# Patient Record
Sex: MALE | Race: OTHER | HISPANIC OR LATINO | URBAN - METROPOLITAN AREA
[De-identification: names, ages, dates, MRNs, and addresses within clinical notes are randomized per-mention and may not be internally consistent; named-entity substitution may affect disease eponyms.]

---

## 2022-03-29 ENCOUNTER — EMERGENCY (EMERGENCY)
Facility: HOSPITAL | Age: 22
LOS: 1 days | Discharge: ANOTHER HOSPITAL | End: 2022-03-29
Attending: EMERGENCY MEDICINE
Payer: MEDICAID

## 2022-03-29 ENCOUNTER — EMERGENCY (EMERGENCY)
Facility: HOSPITAL | Age: 22
LOS: 1 days | Discharge: HOME | End: 2022-03-29
Attending: EMERGENCY MEDICINE | Admitting: EMERGENCY MEDICINE
Payer: MEDICAID

## 2022-03-29 VITALS
SYSTOLIC BLOOD PRESSURE: 122 MMHG | HEART RATE: 90 BPM | OXYGEN SATURATION: 99 % | DIASTOLIC BLOOD PRESSURE: 75 MMHG | TEMPERATURE: 100 F | RESPIRATION RATE: 18 BRPM

## 2022-03-29 VITALS
RESPIRATION RATE: 16 BRPM | SYSTOLIC BLOOD PRESSURE: 102 MMHG | OXYGEN SATURATION: 100 % | DIASTOLIC BLOOD PRESSURE: 67 MMHG | TEMPERATURE: 98 F | HEART RATE: 95 BPM

## 2022-03-29 VITALS — WEIGHT: 143.96 LBS | HEIGHT: 64 IN

## 2022-03-29 DIAGNOSIS — R22.0 LOCALIZED SWELLING, MASS AND LUMP, HEAD: ICD-10-CM

## 2022-03-29 DIAGNOSIS — L03.211 CELLULITIS OF FACE: ICD-10-CM

## 2022-03-29 DIAGNOSIS — R13.10 DYSPHAGIA, UNSPECIFIED: ICD-10-CM

## 2022-03-29 DIAGNOSIS — Z20.822 CONTACT WITH AND (SUSPECTED) EXPOSURE TO COVID-19: ICD-10-CM

## 2022-03-29 DIAGNOSIS — R25.2 CRAMP AND SPASM: ICD-10-CM

## 2022-03-29 DIAGNOSIS — R68.84 JAW PAIN: ICD-10-CM

## 2022-03-29 LAB
ALBUMIN SERPL ELPH-MCNC: 3.6 G/DL — SIGNIFICANT CHANGE UP (ref 3.3–5)
ALP SERPL-CCNC: 78 U/L — SIGNIFICANT CHANGE UP (ref 40–120)
ALT FLD-CCNC: 37 U/L — SIGNIFICANT CHANGE UP (ref 12–78)
ANION GAP SERPL CALC-SCNC: 7 MMOL/L — SIGNIFICANT CHANGE UP (ref 5–17)
AST SERPL-CCNC: 19 U/L — SIGNIFICANT CHANGE UP (ref 15–37)
BASOPHILS # BLD AUTO: 0.04 K/UL — SIGNIFICANT CHANGE UP (ref 0–0.2)
BASOPHILS NFR BLD AUTO: 0.3 % — SIGNIFICANT CHANGE UP (ref 0–2)
BILIRUB SERPL-MCNC: 0.4 MG/DL — SIGNIFICANT CHANGE UP (ref 0.2–1.2)
BUN SERPL-MCNC: 7 MG/DL — SIGNIFICANT CHANGE UP (ref 7–23)
CALCIUM SERPL-MCNC: 9.3 MG/DL — SIGNIFICANT CHANGE UP (ref 8.5–10.1)
CHLORIDE SERPL-SCNC: 103 MMOL/L — SIGNIFICANT CHANGE UP (ref 96–108)
CO2 SERPL-SCNC: 26 MMOL/L — SIGNIFICANT CHANGE UP (ref 22–31)
CREAT SERPL-MCNC: 0.72 MG/DL — SIGNIFICANT CHANGE UP (ref 0.5–1.3)
EGFR: 132 ML/MIN/1.73M2 — SIGNIFICANT CHANGE UP
EOSINOPHIL # BLD AUTO: 0.05 K/UL — SIGNIFICANT CHANGE UP (ref 0–0.5)
EOSINOPHIL NFR BLD AUTO: 0.4 % — SIGNIFICANT CHANGE UP (ref 0–6)
GLUCOSE SERPL-MCNC: 93 MG/DL — SIGNIFICANT CHANGE UP (ref 70–99)
HCT VFR BLD CALC: 49.2 % — SIGNIFICANT CHANGE UP (ref 39–50)
HGB BLD-MCNC: 16.5 G/DL — SIGNIFICANT CHANGE UP (ref 13–17)
IMM GRANULOCYTES NFR BLD AUTO: 0.3 % — SIGNIFICANT CHANGE UP (ref 0–1.5)
LYMPHOCYTES # BLD AUTO: 1.15 K/UL — SIGNIFICANT CHANGE UP (ref 1–3.3)
LYMPHOCYTES # BLD AUTO: 8.1 % — LOW (ref 13–44)
MCHC RBC-ENTMCNC: 28.6 PG — SIGNIFICANT CHANGE UP (ref 27–34)
MCHC RBC-ENTMCNC: 33.5 GM/DL — SIGNIFICANT CHANGE UP (ref 32–36)
MCV RBC AUTO: 85.3 FL — SIGNIFICANT CHANGE UP (ref 80–100)
MONOCYTES # BLD AUTO: 1.66 K/UL — HIGH (ref 0–0.9)
MONOCYTES NFR BLD AUTO: 11.6 % — SIGNIFICANT CHANGE UP (ref 2–14)
NEUTROPHILS # BLD AUTO: 11.32 K/UL — HIGH (ref 1.8–7.4)
NEUTROPHILS NFR BLD AUTO: 79.3 % — HIGH (ref 43–77)
PLATELET # BLD AUTO: 193 K/UL — SIGNIFICANT CHANGE UP (ref 150–400)
POTASSIUM SERPL-MCNC: 3.5 MMOL/L — SIGNIFICANT CHANGE UP (ref 3.5–5.3)
POTASSIUM SERPL-SCNC: 3.5 MMOL/L — SIGNIFICANT CHANGE UP (ref 3.5–5.3)
PROT SERPL-MCNC: 8.1 GM/DL — SIGNIFICANT CHANGE UP (ref 6–8.3)
RBC # BLD: 5.77 M/UL — SIGNIFICANT CHANGE UP (ref 4.2–5.8)
RBC # FLD: 13.2 % — SIGNIFICANT CHANGE UP (ref 10.3–14.5)
SODIUM SERPL-SCNC: 136 MMOL/L — SIGNIFICANT CHANGE UP (ref 135–145)
WBC # BLD: 14.26 K/UL — HIGH (ref 3.8–10.5)
WBC # FLD AUTO: 14.26 K/UL — HIGH (ref 3.8–10.5)

## 2022-03-29 PROCEDURE — 99285 EMERGENCY DEPT VISIT HI MDM: CPT

## 2022-03-29 PROCEDURE — 70487 CT MAXILLOFACIAL W/DYE: CPT | Mod: MA

## 2022-03-29 PROCEDURE — 36415 COLL VENOUS BLD VENIPUNCTURE: CPT

## 2022-03-29 PROCEDURE — 70487 CT MAXILLOFACIAL W/DYE: CPT | Mod: 26,MA

## 2022-03-29 PROCEDURE — 87040 BLOOD CULTURE FOR BACTERIA: CPT

## 2022-03-29 PROCEDURE — 80053 COMPREHEN METABOLIC PANEL: CPT

## 2022-03-29 PROCEDURE — 96374 THER/PROPH/DIAG INJ IV PUSH: CPT

## 2022-03-29 PROCEDURE — 99284 EMERGENCY DEPT VISIT MOD MDM: CPT

## 2022-03-29 PROCEDURE — 96375 TX/PRO/DX INJ NEW DRUG ADDON: CPT

## 2022-03-29 PROCEDURE — 99284 EMERGENCY DEPT VISIT MOD MDM: CPT | Mod: 25

## 2022-03-29 PROCEDURE — U0003: CPT

## 2022-03-29 PROCEDURE — U0005: CPT

## 2022-03-29 PROCEDURE — 85025 COMPLETE CBC W/AUTO DIFF WBC: CPT

## 2022-03-29 RX ORDER — SODIUM CHLORIDE 9 MG/ML
1000 INJECTION INTRAMUSCULAR; INTRAVENOUS; SUBCUTANEOUS ONCE
Refills: 0 | Status: COMPLETED | OUTPATIENT
Start: 2022-03-29 | End: 2022-03-29

## 2022-03-29 RX ORDER — KETOROLAC TROMETHAMINE 30 MG/ML
30 SYRINGE (ML) INJECTION ONCE
Refills: 0 | Status: DISCONTINUED | OUTPATIENT
Start: 2022-03-29 | End: 2022-03-29

## 2022-03-29 RX ADMIN — Medication 30 MILLIGRAM(S): at 19:27

## 2022-03-29 RX ADMIN — Medication 100 MILLIGRAM(S): at 19:29

## 2022-03-29 RX ADMIN — SODIUM CHLORIDE 1000 MILLILITER(S): 9 INJECTION INTRAMUSCULAR; INTRAVENOUS; SUBCUTANEOUS at 21:21

## 2022-03-29 NOTE — ED STATDOCS - ENMT, MLM
Nasal mucosa clear.  Mouth with normal mucosa  Throat has no vesicles, no oropharyngeal exudates and uvula is midline. +Left mandibular tenderness and overlying swelling. No overlying cellulites. +2 finger trismus. +Poor dentition.

## 2022-03-29 NOTE — ED STATDOCS - CLINICAL SUMMARY MEDICAL DECISION MAKING FREE TEXT BOX
signed Alexandra Busby PA-C   Pt with facial cellulitis and swelling, trismus, difficulty swallowing but tolerating secretions. Early abscess on CT. Transfer to [facility] ER for admission and ENT consult. Pt agrees with plan of  care. signed Alexandra Busby PA-C   Pt with facial cellulitis and swelling, trismus, difficulty swallowing but tolerating secretions. Early abscess on CT. Transfer to [facility] ER for admission and ENT consult. Pt agrees with plan of  care.    Agree with above. Ashwin Lee D.O.

## 2022-03-29 NOTE — ED ADULT TRIAGE NOTE - CHIEF COMPLAINT QUOTE
c/o left sided neck swelling and pain in the left jaw. transferred from [facility] for ENT eval. pt reports poor oral intake secondary to pain, no drooling noted, no airway compromise observed, pt received clindamycin 600 mg IVPB and 2 L of NS PTA.

## 2022-03-29 NOTE — ED STATDOCS - CARE PLAN
Patient informed that per Dr. Negrete he should take an additional dose of Cozaar tonight. Informed patient to increase to one tablet twice daily tomorrow. Scheduled patient with NP tomorrow at 8:45 am. Advised patient that he needs to go to ER if diastolic (bottom number of BP reading) goes higher than 105 mm HG, or he develops chest pain, SOB, HA, dizziness/lightheadedness. Patient verbalized understanding.    1 Principal Discharge DX:	Facial cellulitis  Secondary Diagnosis:	Trismus  Secondary Diagnosis:	Difficulty swallowing

## 2022-03-29 NOTE — ED ADULT TRIAGE NOTE - CHIEF COMPLAINT QUOTE
Pt presents to ED s/p left jaw pain/swelling that began 4 days ago. tender on palpation. Endorses decreased PO intake. Pt endorses difficulty swallowing.

## 2022-03-29 NOTE — ED STATDOCS - NS ED ATTENDING STATEMENT MOD
This was a shared visit with the CATHY. I reviewed and verified the documentation and independently performed the documented:

## 2022-03-29 NOTE — ED STATDOCS - PROGRESS NOTE DETAILS
signed Alexandra Busby PA-C Pt seen initially in intake by Dr Lee.  ID [id number]  22M c/o pain in his left lower molar x 4 days and now having pain and swelling, trouble opening his mouth and difficulty swallowing. pt denies any difficulty breathing. Pt alert, NAD, left lower facial swelling, +trismus. Transfer center called for ENT consult and transfer. WBC 14, facial cellulitis with some fluid collection on CT. signed Alexandra Busby PA-C  ID   I spoke to ENT Dr Reid and [facility] ER Dr Curtis, pt to be transferred to [facility] ER for ENT eval, admission, and IV abx. pt stable at this time for transfer, airway patent and pt NAD. signed Alexandra Busby PA-C  ID [id number]  I spoke to ENT Dr Reid and [facility] ER Dr Curtis, pt to be transferred to [facility] ER for ENT eval, admission, and IV abx. pt stable at this time for transfer, airway patent and pt NAD. Pt agrees with transfer and plan of care.

## 2022-03-29 NOTE — ED STATDOCS - OBJECTIVE STATEMENT
23 y/o male with no pertinent PMHx presents to the ED c/o left  jaw pain and swelling x 3 days. +Difficulty opening mouth secondary to the pain. Denies fever, overlying redness, skin changes. Denies prior occurrence. Denies trauma. NKDA.

## 2022-03-29 NOTE — ED ADULT NURSE NOTE - OBJECTIVE STATEMENT
Pt complaining of Jaw pain x 4 days. Sts he is unable to eat due to the pain.  AAOx3. Respirations even and unlabored, NAD. Skin warm, dry, color appropriate.

## 2022-03-30 DIAGNOSIS — K11.20 SIALOADENITIS, UNSPECIFIED: ICD-10-CM

## 2022-03-30 LAB
ALBUMIN SERPL ELPH-MCNC: 3.7 G/DL — SIGNIFICANT CHANGE UP (ref 3.3–5)
ALP SERPL-CCNC: 73 U/L — SIGNIFICANT CHANGE UP (ref 40–120)
ALT FLD-CCNC: 24 U/L — SIGNIFICANT CHANGE UP (ref 4–41)
ANION GAP SERPL CALC-SCNC: 14 MMOL/L — SIGNIFICANT CHANGE UP (ref 7–14)
AST SERPL-CCNC: 26 U/L — SIGNIFICANT CHANGE UP (ref 4–40)
BASOPHILS # BLD AUTO: 0.04 K/UL — SIGNIFICANT CHANGE UP (ref 0–0.2)
BASOPHILS NFR BLD AUTO: 0.3 % — SIGNIFICANT CHANGE UP (ref 0–2)
BILIRUB SERPL-MCNC: 0.3 MG/DL — SIGNIFICANT CHANGE UP (ref 0.2–1.2)
BUN SERPL-MCNC: 8 MG/DL — SIGNIFICANT CHANGE UP (ref 7–23)
CALCIUM SERPL-MCNC: 8.7 MG/DL — SIGNIFICANT CHANGE UP (ref 8.4–10.5)
CHLORIDE SERPL-SCNC: 104 MMOL/L — SIGNIFICANT CHANGE UP (ref 98–107)
CO2 SERPL-SCNC: 21 MMOL/L — LOW (ref 22–31)
CREAT SERPL-MCNC: 0.65 MG/DL — SIGNIFICANT CHANGE UP (ref 0.5–1.3)
EGFR: 136 ML/MIN/1.73M2 — SIGNIFICANT CHANGE UP
EOSINOPHIL # BLD AUTO: 0.04 K/UL — SIGNIFICANT CHANGE UP (ref 0–0.5)
EOSINOPHIL NFR BLD AUTO: 0.3 % — SIGNIFICANT CHANGE UP (ref 0–6)
GLUCOSE SERPL-MCNC: 95 MG/DL — SIGNIFICANT CHANGE UP (ref 70–99)
HCT VFR BLD CALC: 45.5 % — SIGNIFICANT CHANGE UP (ref 39–50)
HGB BLD-MCNC: 15.4 G/DL — SIGNIFICANT CHANGE UP (ref 13–17)
IANC: 11.32 K/UL — HIGH (ref 1.8–7.4)
IMM GRANULOCYTES NFR BLD AUTO: 0.4 % — SIGNIFICANT CHANGE UP (ref 0–1.5)
LYMPHOCYTES # BLD AUTO: 1.16 K/UL — SIGNIFICANT CHANGE UP (ref 1–3.3)
LYMPHOCYTES # BLD AUTO: 7.9 % — LOW (ref 13–44)
MCHC RBC-ENTMCNC: 28.8 PG — SIGNIFICANT CHANGE UP (ref 27–34)
MCHC RBC-ENTMCNC: 33.8 GM/DL — SIGNIFICANT CHANGE UP (ref 32–36)
MCV RBC AUTO: 85 FL — SIGNIFICANT CHANGE UP (ref 80–100)
MONOCYTES # BLD AUTO: 2.03 K/UL — HIGH (ref 0–0.9)
MONOCYTES NFR BLD AUTO: 13.9 % — SIGNIFICANT CHANGE UP (ref 2–14)
NEUTROPHILS # BLD AUTO: 11.32 K/UL — HIGH (ref 1.8–7.4)
NEUTROPHILS NFR BLD AUTO: 77.2 % — HIGH (ref 43–77)
NRBC # BLD: 0 /100 WBCS — SIGNIFICANT CHANGE UP
NRBC # FLD: 0 K/UL — SIGNIFICANT CHANGE UP
PLATELET # BLD AUTO: 193 K/UL — SIGNIFICANT CHANGE UP (ref 150–400)
POTASSIUM SERPL-MCNC: 3.4 MMOL/L — LOW (ref 3.5–5.3)
POTASSIUM SERPL-SCNC: 3.4 MMOL/L — LOW (ref 3.5–5.3)
PROT SERPL-MCNC: 6.8 G/DL — SIGNIFICANT CHANGE UP (ref 6–8.3)
RBC # BLD: 5.35 M/UL — SIGNIFICANT CHANGE UP (ref 4.2–5.8)
RBC # FLD: 13.2 % — SIGNIFICANT CHANGE UP (ref 10.3–14.5)
SARS-COV-2 RNA SPEC QL NAA+PROBE: SIGNIFICANT CHANGE UP
SODIUM SERPL-SCNC: 139 MMOL/L — SIGNIFICANT CHANGE UP (ref 135–145)
WBC # BLD: 14.65 K/UL — HIGH (ref 3.8–10.5)
WBC # FLD AUTO: 14.65 K/UL — HIGH (ref 3.8–10.5)

## 2022-03-30 PROCEDURE — 99235 HOSP IP/OBS SAME DATE MOD 70: CPT

## 2022-03-30 PROCEDURE — 99218: CPT

## 2022-03-30 RX ORDER — SODIUM CHLORIDE 9 MG/ML
1000 INJECTION INTRAMUSCULAR; INTRAVENOUS; SUBCUTANEOUS ONCE
Refills: 0 | Status: COMPLETED | OUTPATIENT
Start: 2022-03-30 | End: 2022-03-30

## 2022-03-30 RX ORDER — AMPICILLIN SODIUM AND SULBACTAM SODIUM 250; 125 MG/ML; MG/ML
3 INJECTION, POWDER, FOR SUSPENSION INTRAMUSCULAR; INTRAVENOUS EVERY 6 HOURS
Refills: 0 | Status: DISCONTINUED | OUTPATIENT
Start: 2022-03-30 | End: 2022-04-02

## 2022-03-30 RX ORDER — DEXAMETHASONE 0.5 MG/5ML
10 ELIXIR ORAL ONCE
Refills: 0 | Status: COMPLETED | OUTPATIENT
Start: 2022-03-30 | End: 2022-03-30

## 2022-03-30 RX ORDER — OXYCODONE AND ACETAMINOPHEN 5; 325 MG/1; MG/1
1 TABLET ORAL ONCE
Refills: 0 | Status: DISCONTINUED | OUTPATIENT
Start: 2022-03-30 | End: 2022-03-30

## 2022-03-30 RX ORDER — SODIUM CHLORIDE 9 MG/ML
1000 INJECTION INTRAMUSCULAR; INTRAVENOUS; SUBCUTANEOUS
Refills: 0 | Status: DISCONTINUED | OUTPATIENT
Start: 2022-03-30 | End: 2022-04-02

## 2022-03-30 RX ORDER — AMPICILLIN SODIUM AND SULBACTAM SODIUM 250; 125 MG/ML; MG/ML
3 INJECTION, POWDER, FOR SUSPENSION INTRAMUSCULAR; INTRAVENOUS ONCE
Refills: 0 | Status: COMPLETED | OUTPATIENT
Start: 2022-03-30 | End: 2022-03-30

## 2022-03-30 RX ORDER — KETOROLAC TROMETHAMINE 30 MG/ML
30 SYRINGE (ML) INJECTION EVERY 6 HOURS
Refills: 0 | Status: DISCONTINUED | OUTPATIENT
Start: 2022-03-30 | End: 2022-03-31

## 2022-03-30 RX ORDER — AMPICILLIN SODIUM AND SULBACTAM SODIUM 250; 125 MG/ML; MG/ML
INJECTION, POWDER, FOR SUSPENSION INTRAMUSCULAR; INTRAVENOUS
Refills: 0 | Status: DISCONTINUED | OUTPATIENT
Start: 2022-03-30 | End: 2022-04-02

## 2022-03-30 RX ADMIN — AMPICILLIN SODIUM AND SULBACTAM SODIUM 200 GRAM(S): 250; 125 INJECTION, POWDER, FOR SUSPENSION INTRAMUSCULAR; INTRAVENOUS at 07:45

## 2022-03-30 RX ADMIN — AMPICILLIN SODIUM AND SULBACTAM SODIUM 200 GRAM(S): 250; 125 INJECTION, POWDER, FOR SUSPENSION INTRAMUSCULAR; INTRAVENOUS at 02:35

## 2022-03-30 RX ADMIN — Medication 30 MILLIGRAM(S): at 12:55

## 2022-03-30 RX ADMIN — AMPICILLIN SODIUM AND SULBACTAM SODIUM 200 GRAM(S): 250; 125 INJECTION, POWDER, FOR SUSPENSION INTRAMUSCULAR; INTRAVENOUS at 12:38

## 2022-03-30 RX ADMIN — SODIUM CHLORIDE 125 MILLILITER(S): 9 INJECTION INTRAMUSCULAR; INTRAVENOUS; SUBCUTANEOUS at 18:38

## 2022-03-30 RX ADMIN — OXYCODONE AND ACETAMINOPHEN 1 TABLET(S): 5; 325 TABLET ORAL at 04:14

## 2022-03-30 RX ADMIN — Medication 30 MILLIGRAM(S): at 12:39

## 2022-03-30 RX ADMIN — Medication 30 MILLIGRAM(S): at 18:39

## 2022-03-30 RX ADMIN — Medication 102 MILLIGRAM(S): at 23:25

## 2022-03-30 RX ADMIN — OXYCODONE AND ACETAMINOPHEN 1 TABLET(S): 5; 325 TABLET ORAL at 04:56

## 2022-03-30 RX ADMIN — Medication 30 MILLIGRAM(S): at 05:02

## 2022-03-30 RX ADMIN — Medication 30 MILLIGRAM(S): at 05:36

## 2022-03-30 RX ADMIN — SODIUM CHLORIDE 1000 MILLILITER(S): 9 INJECTION INTRAMUSCULAR; INTRAVENOUS; SUBCUTANEOUS at 14:47

## 2022-03-30 RX ADMIN — Medication 102 MILLIGRAM(S): at 14:47

## 2022-03-30 RX ADMIN — Medication 30 MILLIGRAM(S): at 00:01

## 2022-03-30 RX ADMIN — AMPICILLIN SODIUM AND SULBACTAM SODIUM 3 GRAM(S): 250; 125 INJECTION, POWDER, FOR SUSPENSION INTRAMUSCULAR; INTRAVENOUS at 02:30

## 2022-03-30 RX ADMIN — AMPICILLIN SODIUM AND SULBACTAM SODIUM 200 GRAM(S): 250; 125 INJECTION, POWDER, FOR SUSPENSION INTRAMUSCULAR; INTRAVENOUS at 18:38

## 2022-03-30 RX ADMIN — Medication 30 MILLIGRAM(S): at 18:50

## 2022-03-30 NOTE — ED PROVIDER NOTE - PHYSICAL EXAMINATION
Vital signs reviewed.   CONSTITUTIONAL: Well-appearing; well-nourished; in no apparent distress. Non-toxic appearing.   HEAD: Normocephalic, atraumatic.  EYES:  Normal conjunctiva and no sclera injection noted  ENT: normal nose; no rhinorrhea; Unable to visualize soft palate/tonsils. (+) Trismus. Tolerating secretions. No drooling. Maintaining airway. LT external jaw swelling  NECK/LYMPH: Supple; non-tender; no cervical lymphadenopathy.  CARD: Normal S1, S2  RESP: Normal chest excursion with respiration; breath sounds clear and equal bilaterally  EXT/MS: moves all extremities; distal pulses are normal, no pedal edema.  SKIN: Normal for age and race; warm; dry; good turgor; no apparent lesions or exudate noted.  NEURO: Awake, alert, oriented x 3  PSYCH: Normal mood; appropriate affect.

## 2022-03-30 NOTE — ED CDU PROVIDER INITIAL DAY NOTE - OBJECTIVE STATEMENT
23 Y/O M w/ no PMH transferred from [facility] to ER for LT sided jaw pain. Admits to onset of symptoms 4 days ago. Reports worsening exterior swelling and pain to jaw. Experiencing pain with eating but tolerating PO oral intake. Seen at [place] ER earlier today. CT demonstrated enlarged submandibular gland, may represent possible phlegmon or early abscess. Denies fever, nausea/vomiting, dizziness, difficulty breathing, headache, chest pain, shortness of breath, palpitations. ENT evaluated patient. No airway involvement, likely sialoadenitis. IV abx, pain management, re-evaluate in am

## 2022-03-30 NOTE — PROGRESS NOTE ADULT - SUBJECTIVE AND OBJECTIVE BOX
HPI:  Patient is a 22y Male no PMH p/w 4 days L neck pain associated with trismus and odynophagia. Decreased PO intake. Subjective fevers. No h/o of similar sx. Seen at [place], CT scan there showed enlarged submandibular gland with surrounding cellulitis, non-rim enhancing fluid collection of L FOM representing possible phlegmon vs. early abscess. No SOB.      Physical Exam  T(C): 36.8 (03-29-22 @ 22:58), Max: 36.8 (03-29-22 @ 22:58)  HR: 95 (03-29-22 @ 22:58) (95 - 95)  BP: 102/67 (03-29-22 @ 22:58) (102/67 - 102/67)  RR: 16 (03-29-22 @ 22:58) (16 - 16)  SpO2: 100% (03-29-22 @ 22:58) (100% - 100%)  General: NAD, A+Ox3  Resp: No respiratory distress, stridor, or stertor  Voice quality: normal  Face:  Symmetric without masses or lesions  OU: EOMI  OC/OP: tongue normal, floor of mouth soft but TTP around second molar with some edema, no masses or lesions, OP clear; no pus noted from warthin's duct with L submandibular gland massage  Neck: L submandibular induration and warmth, TTP  CNII-XII intact        LARYNGOSCOPY EXAM:     Verbal consent was obtained from patient prior to procedure.    Indication: L neck pain    Flexible laryngoscopy was performed and revealed the following:    Nasopharynx had no mass or exudate.    Base of tongue was symmetric and not enlarged.    Vallecula was clear    Epiglottis, both aryepiglottic folds and both false vocal folds were normal    Mild L pharyngeal wall edema    Arytenoids both without edema and erythema     Mild erythema of L pyriform sinus    True vocal folds were fully mobile and without lesions    Post cricoid area was clear    Interarytenoid edema was absent    The patient tolerated the procedure well.      A/P: 22y Male p/w 4d L neck pain, CT at [place] shows enlarged L submandibular gland with surrounding cellulitis. Sx most consistent with sialadenitis.  -recommend few more doses of IV abx (Unasyn)  -can monitor in CDU and if pt feels well later today can likely dc on augmentin x10d with outpt f/u  -MASH if patient able to tolerate  -sialagogues  -warm compresses       --------------------------------------------------------------  Thank you for the consult,    Alejandra Evans MD  Resident  Department of Otolaryngology - Head and Neck Surgery  Peds Page #[phone]  Adult Page #[phone]  --------------------------------------------------------------- HPI:  Patient is a 22y Male no PMH p/w 4 days L neck pain associated with trismus and odynophagia. Decreased PO intake. Subjective fevers. No h/o of similar sx. Seen at [place], CT scan there showed enlarged submandibular gland with surrounding cellulitis, non-rim enhancing fluid collection of L FOM representing possible phlegmon vs. early abscess. No SOB.      Physical Exam  T(C): 36.8 (03-29-22 @ 22:58), Max: 36.8 (03-29-22 @ 22:58)  HR: 95 (03-29-22 @ 22:58) (95 - 95)  BP: 102/67 (03-29-22 @ 22:58) (102/67 - 102/67)  RR: 16 (03-29-22 @ 22:58) (16 - 16)  SpO2: 100% (03-29-22 @ 22:58) (100% - 100%)  General: NAD, A+Ox3  Resp: No respiratory distress, stridor, or stertor  Voice quality: normal  Face:  Symmetric without masses or lesions  OU: EOMI  OC/OP: trismus, tongue normal, floor of mouth soft but TTP around second molar with some edema, no masses or lesions, OP clear; no pus noted from warthin's duct with L submandibular gland massage  Neck: L submandibular induration and warmth, TTP  CNII-XII intact        LARYNGOSCOPY EXAM:     Verbal consent was obtained from patient prior to procedure.    Indication: L neck pain    Flexible laryngoscopy was performed and revealed the following:    Nasopharynx had no mass or exudate.    Base of tongue was symmetric and not enlarged.    Vallecula was clear    Epiglottis, both aryepiglottic folds and both false vocal folds were normal    Mild L pharyngeal wall edema    Arytenoids both without edema and erythema     Mild erythema of L pyriform sinus    True vocal folds were fully mobile and without lesions    Post cricoid area was clear    Interarytenoid edema was absent    The patient tolerated the procedure well.      A/P: 22y Male p/w 4d L neck pain, CT at [place] shows enlarged L submandibular gland with surrounding cellulitis. Sx most consistent with sialadenitis.  -recommend few more doses of IV abx (Unasyn)  -can monitor in CDU and if pt feels well later today can likely dc on augmentin x10d with outpt f/u  -MASH if patient able to tolerate  -sialagogues  -warm compresses       --------------------------------------------------------------  Thank you for the consult,    Alejandra Evans MD  Resident  Department of Otolaryngology - Head and Neck Surgery  Peds Page #[phone]  Adult Page #[phone]  --------------------------------------------------------------- HPI:  Patient is a 22y Male no PMH p/w 4 days L neck pain associated with trismus and odynophagia. Decreased PO intake. Subjective fevers. No h/o of similar sx. Seen at [place], CT scan there showed enlarged submandibular gland with surrounding cellulitis, non-rim enhancing fluid collection of L FOM representing possible phlegmon vs. early abscess. No SOB. WBC at [place] 16.      Physical Exam  T(C): 36.8 (03-29-22 @ 22:58), Max: 36.8 (03-29-22 @ 22:58)  HR: 95 (03-29-22 @ 22:58) (95 - 95)  BP: 102/67 (03-29-22 @ 22:58) (102/67 - 102/67)  RR: 16 (03-29-22 @ 22:58) (16 - 16)  SpO2: 100% (03-29-22 @ 22:58) (100% - 100%)  General: NAD, A+Ox3  Resp: No respiratory distress, stridor, or stertor  Voice quality: normal  Face:  Symmetric without masses or lesions  OU: EOMI  OC/OP: trismus, tongue normal, floor of mouth soft but TTP around second molar with some edema, no masses or lesions, OP clear; no pus noted from warthin's duct with L submandibular gland massage  Neck: L submandibular induration and warmth, TTP  CNII-XII intact        LARYNGOSCOPY EXAM:     Verbal consent was obtained from patient prior to procedure.    Indication: L neck pain    Flexible laryngoscopy was performed and revealed the following:    Nasopharynx had no mass or exudate.    Base of tongue was symmetric and not enlarged.    Vallecula was clear    Epiglottis, both aryepiglottic folds and both false vocal folds were normal    Mild L pharyngeal wall edema    Arytenoids both without edema and erythema     Mild erythema of L pyriform sinus    True vocal folds were fully mobile and without lesions    Post cricoid area was clear    Interarytenoid edema was absent    The patient tolerated the procedure well.      A/P: 22y Male p/w 4d L neck pain, CT at [place] shows enlarged L submandibular gland with surrounding cellulitis. Sx most consistent with sialadenitis.  -recommend few more doses of IV abx (Unasyn)  -can monitor in CDU and if pt feels well later today can likely dc on augmentin x10d with outpt f/u  -MASH if patient able to tolerate  -sialagogues  -warm compresses       --------------------------------------------------------------  Thank you for the consult,    Alejandra Evans MD  Resident  Department of Otolaryngology - Head and Neck Surgery  Peds Page #[phone]  Adult Page #[phone]  ---------------------------------------------------------------

## 2022-03-30 NOTE — ED PROVIDER NOTE - ATTENDING CONTRIBUTION TO CARE
22 year old with jaw pain. ct on outside hospital shows possible abscedss vs phlegmon. tolerating po. ent cx, abx, likely cdu 22 year old with jaw pain. ct on outside hospital shows possible abscess vs phlegmon. tolerating po. ent cx, abx, likely cdu

## 2022-03-30 NOTE — ED ADULT NURSE NOTE - CHIEF COMPLAINT QUOTE
c/o left sided neck swelling and pain in the left jaw. transferred from [facility] for ENT eval. pt reports poor oral intake secondary to pain, no drooling noted, no airway compromise observed, pt received clindamycin 600 mg IVPB and 2 L of NS PTA.

## 2022-03-30 NOTE — PROGRESS NOTE ADULT - SUBJECTIVE AND OBJECTIVE BOX
ENT ISSUE/POD:  Left submandibular pain     HPI: Patient seen and examed at bedside. Reports pain and tenderness at the left submandibular area. Unable to tolerates massage at all. Afebril.         PAST MEDICAL & SURGICAL HISTORY:    Allergies    No Known Allergies    Intolerances      MEDICATIONS  (STANDING):  ampicillin/sulbactam  IVPB      ampicillin/sulbactam  IVPB 3 Gram(s) IV Intermittent every 6 hours  dexAMETHasone  IVPB 10 milliGRAM(s) IV Intermittent once  sodium chloride 0.9% Bolus 1000 milliLiter(s) IV Bolus once  sodium chloride 0.9%. 1000 milliLiter(s) (125 mL/Hr) IV Continuous <Continuous>    MEDICATIONS  (PRN):  ketorolac   Injectable 30 milliGRAM(s) IV Push every 6 hours PRN Mild Pain (1 - 3)      Social History: see consult note    Family history: see consult note    ROS:   ENT: all negative except as noted in HPI   Pulm: denies SOB, cough, hemoptysis  Neuro: denies numbness/tingling, loss of sensation  Endo: denies heat/cold intolerance, excessive sweating      Vital Signs Last 24 Hrs  T(C): 37.3 (30 Mar 2022 09:58), Max: 37.4 (30 Mar 2022 02:22)  T(F): 99.1 (30 Mar 2022 09:58), Max: 99.4 (30 Mar 2022 02:22)  HR: 99 (30 Mar 2022 09:58) (92 - 99)  BP: 116/65 (30 Mar 2022 09:58) (102/67 - 116/65)  BP(mean): --  RR: 16 (30 Mar 2022 09:58) (16 - 18)  SpO2: 99% (30 Mar 2022 09:58) (99% - 100%)                          15.4   14.65 )-----------( 193      ( 30 Mar 2022 07:22 )             45.5    03-30    139  |  104  |  8   ----------------------------<  95  3.4<L>   |  21<L>  |  0.65    Ca    8.7      30 Mar 2022 07:22    TPro  6.8  /  Alb  3.7  /  TBili  0.3  /  DBili  x   /  AST  26  /  ALT  24  /  AlkPhos  73  03-30       PHYSICAL EXAM:  Gen: NAD  Skin: No rashes, bruises, or lesions  Head: Normocephalic, Atraumatic  Face: no edema, erythema, or fluctuance. Parotid glands soft without mass  Eyes: no scleral injection  Nose: Nares bilaterally patent, no discharge  Mouth: No Stridor / Drooling / Trismus.  Floor of mouth soft but tenderness to palpate around second molar with some edema, no masses or lesions, OP clear; no pus noted from toño's duct with L submandibular gland massage  Neck:  L submandibular induration and warmth, tenderness to palpate  Lymphatic: No lymphadenopathy  Resp: breathing easily, no stridor  Neuro: facial nerve intact, no facial droop

## 2022-03-30 NOTE — ED CDU PROVIDER INITIAL DAY NOTE - ATTENDING CONTRIBUTION TO CARE
23 Y/O M w/ no PMH transferred from [facility] to ER for LT sided jaw pain. ENT evaluated patient. No airway involvement, likely sialoadenitis. IV abx, pain management, re-evaluate in am

## 2022-03-30 NOTE — ED ADULT NURSE NOTE - ED STAT RN HANDOFF DETAILS
Report given to CDU RN Mary, pt ambualting, a&04 respiraitons even and unlabored in no acute distress. 22GIV in place.

## 2022-03-30 NOTE — ED PROVIDER NOTE - CLINICAL SUMMARY MEDICAL DECISION MAKING FREE TEXT BOX
23 Y/O M w/ no PMH transferred from [facility] to ER for LT sided jaw pain.  Pain Management  ENT consult  CDU stay

## 2022-03-30 NOTE — ED PROVIDER NOTE - NS ED ROS FT
Constitutional: (-) fever (-) vomiting  Head: Normal cephalic, Atraumatic  Eyes/ENT: (+) LT jaw pain  Cardiovascular: (-) chest pain, (-) wheezing  Respiratory: (-) cough, (-) shortness of breath  Gastrointestinal: (-) vomiting, (-) diarrhea, (-) abdominal pain  : (-) dysuria   Musculoskeletal: (-) back pain  Integumentary: (-) rash, (-) edema  Neurological: (-)loc  Allergic/Immunologic: (-) pruritus

## 2022-03-30 NOTE — ED ADULT NURSE NOTE - OBJECTIVE STATEMENT
pt transferred from [facility], complaining of left sided jaw swelling. Patient respirations even and unlabored, speaking in full sentences. able to tolerate PO intake but experiencing pain.

## 2022-03-30 NOTE — PROGRESS NOTE ADULT - PROBLEM SELECTOR PLAN 1
- Decadron 10mg IVPB x 1 for pain and swelling  - Continue IV unasyn while inpatient, can transition to PO augmentin 875-125mg PO BID for a total of 10 days  - keep saliva flowing to keep duct open and reduce risk of narrowing:  - Massage gland every hour while awake  - ice for first 24 -48 hours, then can use warm compress q4 hours for 20 minutes while awake  - Aggressive PO hydration, consider IVF if unable to tolerate PO  - Pain medication - Tylenol or Motrin as needed per ED provider  - Expect swelling and discomfort  - Continue trending WBC  - Sialagogues (lemon or sour candies) q4 hours while awake  - Diet per ED provider  - Follow up 1 week in the office, please call [phone] for an appointment  - [place] at [phone] with any questions  - Case discussed with Dr. Mancuso

## 2022-03-30 NOTE — ED CDU PROVIDER INITIAL DAY NOTE - PROGRESS NOTE DETAILS
received sign out from CONSUELO Crook this morning. ID German # [id number] pt feeling better, plan to po challenge and ENT to reassess. as per ent suggesting decadron IV 10mg x 1, fluids, reassess, pain control, likely to keep pt overnight in cdu  # [id number]- Pt feeling better s/p medications but still with some trismus pt states 2/2 pain. No drooling. Pt tolerating PO liquids and some applesauce. Pt declined analgesic at this time. Pt noted to be tender over Lt mandible. Mild swelling noted. Pt overall feeling better. Will continue with iv abx., steroid and pain control. ENT following.

## 2022-03-30 NOTE — ED PROVIDER NOTE - OBJECTIVE STATEMENT
23 Y/O M w/ no PMH transferred from [facility] to ER for LT sided jaw pain. Admits to onset of symptoms 4 days ago. Reports worsening exterior swelling and pain to jaw. Experiencing pain with eating but tolerating PO oral intake. Seen at [place] ER earlier today. CT demonstrated enlarged submandibular gland, may represent possible phlegmon or early abscess. Sent for ENT consult. Denies fever, nausea/vomiting, dizziness, difficulty breathing, headache, chest pain, shortness of breath, palpitations.

## 2022-03-30 NOTE — ED CDU PROVIDER INITIAL DAY NOTE - PHYSICAL EXAMINATION
Physical Examination: Vital signs reviewed.   	CONSTITUTIONAL: Well-appearing; well-nourished; in no apparent distress. Non-toxic appearing.   	HEAD: Normocephalic, atraumatic.  	EYES:  Normal conjunctiva and no sclera injection noted  	ENT: normal nose; no rhinorrhea; Unable to visualize soft palate/tonsils. (+) Trismus. Tolerating secretions. No drooling. Maintaining airway. LT external jaw swelling  	NECK/LYMPH: Supple; non-tender; no cervical lymphadenopathy.  	CARD: Normal S1, S2  	RESP: Normal chest excursion with respiration; breath sounds clear and equal bilaterally  	EXT/MS: moves all extremities; distal pulses are normal, no pedal edema.  	SKIN: Normal for age and race; warm; dry; good turgor; no apparent lesions or exudate noted.  	NEURO: Awake, alert, oriented x 3  PSYCH: Normal mood; appropriate affect.

## 2022-03-30 NOTE — ED CDU PROVIDER INITIAL DAY NOTE - MEDICAL DECISION MAKING DETAILS
23 Y/O M w/ no PMH transferred from [facility] to ER for LT sided jaw pain. ENT evaluated patient. No airway involvement, likely sialoadenitis. IV abx, pain management, re-evaluate in am

## 2022-03-31 VITALS
OXYGEN SATURATION: 99 % | RESPIRATION RATE: 17 BRPM | SYSTOLIC BLOOD PRESSURE: 108 MMHG | TEMPERATURE: 98 F | DIASTOLIC BLOOD PRESSURE: 67 MMHG | HEART RATE: 75 BPM

## 2022-03-31 LAB
ALBUMIN SERPL ELPH-MCNC: 3.5 G/DL — SIGNIFICANT CHANGE UP (ref 3.3–5)
ALP SERPL-CCNC: 70 U/L — SIGNIFICANT CHANGE UP (ref 40–120)
ALT FLD-CCNC: 33 U/L — SIGNIFICANT CHANGE UP (ref 4–41)
ANION GAP SERPL CALC-SCNC: 13 MMOL/L — SIGNIFICANT CHANGE UP (ref 7–14)
AST SERPL-CCNC: 31 U/L — SIGNIFICANT CHANGE UP (ref 4–40)
BASOPHILS # BLD AUTO: 0.01 K/UL — SIGNIFICANT CHANGE UP (ref 0–0.2)
BASOPHILS NFR BLD AUTO: 0.1 % — SIGNIFICANT CHANGE UP (ref 0–2)
BILIRUB SERPL-MCNC: 0.2 MG/DL — SIGNIFICANT CHANGE UP (ref 0.2–1.2)
BUN SERPL-MCNC: 8 MG/DL — SIGNIFICANT CHANGE UP (ref 7–23)
CALCIUM SERPL-MCNC: 8.9 MG/DL — SIGNIFICANT CHANGE UP (ref 8.4–10.5)
CHLORIDE SERPL-SCNC: 106 MMOL/L — SIGNIFICANT CHANGE UP (ref 98–107)
CO2 SERPL-SCNC: 21 MMOL/L — LOW (ref 22–31)
CREAT SERPL-MCNC: 0.49 MG/DL — LOW (ref 0.5–1.3)
EGFR: 148 ML/MIN/1.73M2 — SIGNIFICANT CHANGE UP
EOSINOPHIL # BLD AUTO: 0 K/UL — SIGNIFICANT CHANGE UP (ref 0–0.5)
EOSINOPHIL NFR BLD AUTO: 0 % — SIGNIFICANT CHANGE UP (ref 0–6)
GLUCOSE SERPL-MCNC: 154 MG/DL — HIGH (ref 70–99)
HCT VFR BLD CALC: 46 % — SIGNIFICANT CHANGE UP (ref 39–50)
HGB BLD-MCNC: 15.6 G/DL — SIGNIFICANT CHANGE UP (ref 13–17)
IANC: 14.25 K/UL — HIGH (ref 1.8–7.4)
IMM GRANULOCYTES NFR BLD AUTO: 0.6 % — SIGNIFICANT CHANGE UP (ref 0–1.5)
LYMPHOCYTES # BLD AUTO: 0.84 K/UL — LOW (ref 1–3.3)
LYMPHOCYTES # BLD AUTO: 5.4 % — LOW (ref 13–44)
MCHC RBC-ENTMCNC: 28.6 PG — SIGNIFICANT CHANGE UP (ref 27–34)
MCHC RBC-ENTMCNC: 33.9 GM/DL — SIGNIFICANT CHANGE UP (ref 32–36)
MCV RBC AUTO: 84.4 FL — SIGNIFICANT CHANGE UP (ref 80–100)
MONOCYTES # BLD AUTO: 0.38 K/UL — SIGNIFICANT CHANGE UP (ref 0–0.9)
MONOCYTES NFR BLD AUTO: 2.4 % — SIGNIFICANT CHANGE UP (ref 2–14)
NEUTROPHILS # BLD AUTO: 14.25 K/UL — HIGH (ref 1.8–7.4)
NEUTROPHILS NFR BLD AUTO: 91.5 % — HIGH (ref 43–77)
NRBC # BLD: 0 /100 WBCS — SIGNIFICANT CHANGE UP
NRBC # FLD: 0 K/UL — SIGNIFICANT CHANGE UP
PLATELET # BLD AUTO: 226 K/UL — SIGNIFICANT CHANGE UP (ref 150–400)
POTASSIUM SERPL-MCNC: 4 MMOL/L — SIGNIFICANT CHANGE UP (ref 3.5–5.3)
POTASSIUM SERPL-SCNC: 4 MMOL/L — SIGNIFICANT CHANGE UP (ref 3.5–5.3)
PROT SERPL-MCNC: 6.7 G/DL — SIGNIFICANT CHANGE UP (ref 6–8.3)
RBC # BLD: 5.45 M/UL — SIGNIFICANT CHANGE UP (ref 4.2–5.8)
RBC # FLD: 13.1 % — SIGNIFICANT CHANGE UP (ref 10.3–14.5)
SODIUM SERPL-SCNC: 140 MMOL/L — SIGNIFICANT CHANGE UP (ref 135–145)
WBC # BLD: 15.58 K/UL — HIGH (ref 3.8–10.5)
WBC # FLD AUTO: 15.58 K/UL — HIGH (ref 3.8–10.5)

## 2022-03-31 PROCEDURE — 99217: CPT | Mod: FS

## 2022-03-31 RX ORDER — IBUPROFEN 200 MG
1 TABLET ORAL
Qty: 30 | Refills: 0
Start: 2022-03-31 | End: 2022-04-09

## 2022-03-31 RX ADMIN — AMPICILLIN SODIUM AND SULBACTAM SODIUM 200 GRAM(S): 250; 125 INJECTION, POWDER, FOR SUSPENSION INTRAMUSCULAR; INTRAVENOUS at 06:00

## 2022-03-31 RX ADMIN — AMPICILLIN SODIUM AND SULBACTAM SODIUM 200 GRAM(S): 250; 125 INJECTION, POWDER, FOR SUSPENSION INTRAMUSCULAR; INTRAVENOUS at 00:30

## 2022-03-31 RX ADMIN — Medication 30 MILLIGRAM(S): at 00:30

## 2022-03-31 RX ADMIN — Medication 30 MILLIGRAM(S): at 00:45

## 2022-03-31 RX ADMIN — SODIUM CHLORIDE 125 MILLILITER(S): 9 INJECTION INTRAMUSCULAR; INTRAVENOUS; SUBCUTANEOUS at 00:30

## 2022-03-31 NOTE — PROGRESS NOTE ADULT - SUBJECTIVE AND OBJECTIVE BOX
SUBJECTIVE:  Pt seen & examined at bedside  No acute events overnight  Pain improving  induration and edema significantly improved, still has some TTP and trismus  Ambulating  Tolerating diet, doing better with PO  No F/C, N/V, CP/SOB    Vital Signs Last 24 Hrs  T(C): 36.4 (31 Mar 2022 10:20), Max: 37.6 (30 Mar 2022 13:44)  T(F): 97.6 (31 Mar 2022 10:20), Max: 99.7 (30 Mar 2022 13:44)  HR: 75 (31 Mar 2022 10:20) (62 - 103)  BP: 108/67 (31 Mar 2022 10:20) (108/67 - 124/63)  BP(mean): --  RR: 17 (31 Mar 2022 10:20) (16 - 18)  SpO2: 99% (31 Mar 2022 10:20) (98% - 100%)  General: NAD, A+Ox3  Resp: No respiratory distress, stridor, or stertor  Voice quality: normal  Face:  Symmetric without masses or lesions  OU: EOMI  OC/OP: trismus, tongue normal, floor of mouth soft, improved TTP around second molar with some edema, no masses or lesions, OP clear; no pus noted from warthin's duct with L submandibular gland massage  Neck: L submandibular induration, TTP but much improved  CNII-XII intact     SUBJECTIVE:  Pt seen & examined at bedside  No acute events overnight  Pain improving  induration and edema significantly improved, still has some TTP and trismus  Ambulating  Tolerating diet, doing better with PO  No F/C, N/V, CP/SOB    Vital Signs Last 24 Hrs  T(C): 36.4 (31 Mar 2022 10:20), Max: 37.6 (30 Mar 2022 13:44)  T(F): 97.6 (31 Mar 2022 10:20), Max: 99.7 (30 Mar 2022 13:44)  HR: 75 (31 Mar 2022 10:20) (62 - 103)  BP: 108/67 (31 Mar 2022 10:20) (108/67 - 124/63)  BP(mean): --  RR: 17 (31 Mar 2022 10:20) (16 - 18)  SpO2: 99% (31 Mar 2022 10:20) (98% - 100%)  General: NAD, A+Ox3  Resp: No respiratory distress, stridor, or stertor  Voice quality: normal  Face:  Symmetric without masses or lesions  OU: EOMI  OC/OP: trismus, tongue normal, floor of mouth soft, improved TTP around second molar with some edema, no masses or lesions, OP clear; no pus noted from warthin's duct with L submandibular gland massage  Neck: L submandibular induration, TTP but much improved  CNII-XII intact    21 y/o Male p/w 4d L neck pain, CT at [place] shows enlarged L submandibular gland with surrounding cellulitis. Sx most consistent with left sialadenitis, monitored on IV unasyn, now much improved induration and TTP.  Problem: Sialadenitis.   Plan:  - Pt much improved, can transition to PO augmentin 875-125mg PO BID for a total of 10 days  - OK to dc from ENT standpoint  - keep saliva flowing to keep duct open and reduce risk of narrowing  - Massage gland every hour while awake  - ice for first 24 -48 hours, then can use warm compress q4 hours for 20 minutes while awake  - Aggressive PO hydration  - Tylenol or Motrin as needed  - Expect swelling and discomfort  - Sialagogues (lemon or sour candies) q4 hours while awake  - Follow up 1 week in the office, please call [phone] for an appointment  - Please page ENT at [phone] with any questions  - Case discussed with Dr. Mancuso. SUBJECTIVE:  Pt seen & examined at bedside  No acute events overnight  Pain improving  induration and edema significantly improved, still has some TTP and trismus  Ambulating  Tolerating diet, doing better with PO  No F/C, N/V, CP/SOB    Vital Signs Last 24 Hrs  T(C): 36.4 (31 Mar 2022 10:20), Max: 37.6 (30 Mar 2022 13:44)  T(F): 97.6 (31 Mar 2022 10:20), Max: 99.7 (30 Mar 2022 13:44)  HR: 75 (31 Mar 2022 10:20) (62 - 103)  BP: 108/67 (31 Mar 2022 10:20) (108/67 - 124/63)  BP(mean): --  RR: 17 (31 Mar 2022 10:20) (16 - 18)  SpO2: 99% (31 Mar 2022 10:20) (98% - 100%)  General: NAD, A+Ox3  Resp: No respiratory distress, stridor, or stertor  Voice quality: normal  Face:  Symmetric without masses or lesions  OU: EOMI  OC/OP: trismus, tongue normal, floor of mouth soft, improved TTP around second molar with some edema, no masses or lesions, OP clear; no pus noted from warthin's duct with L submandibular gland massage  Neck: L submandibular induration, TTP but much improved  CNII-XII intact    21 y/o Male p/w 4d L neck pain, CT at [place] shows enlarged L submandibular gland with surrounding cellulitis. Sx most consistent with left sialadenitis, monitored on IV unasyn, now much improved induration and TTP.  Problem: Sialadenitis.   Plan:  - Pt much improved, can transition to PO augmentin 875-125mg PO BID for a total of 10 days  - OK to dc from ENT standpoint  - keep saliva flowing to keep duct open and reduce risk of narrowing  - Massage gland every hour while awake  - Can use warm compress q4 hours for 20 minutes while awake  - Aggressive PO hydration  - Tylenol or Motrin as needed  - Expect swelling and discomfort  - Sialagogues (lemon or sour candies) q4 hours while awake  - Follow up 1 week in the office, please call [phone] for an appointment  - [place]NT at [phone] with any questions  - Case discussed with Dr. Mancuso.

## 2022-03-31 NOTE — ED CDU PROVIDER SUBSEQUENT DAY NOTE - PROGRESS NOTE DETAILS
Pt reassessed w/ Dr. Gutierrez (spoke to pt in Nepali), pt states he is feeling much better. Has minimal submandibular pain now. Has been able to eat/drink. No pain with swallowing. No fevers overnight. Pt also seen and cleared for DC by ENT. Will dc w/ rx for Augmentin/Ibuprofen, instructions to use sialogogues /warm compresses to help swelling subside.

## 2022-03-31 NOTE — ED CDU PROVIDER SUBSEQUENT DAY NOTE - PHYSICAL EXAMINATION
Vital signs reviewed.   CONSTITUTIONAL: Well-appearing; well-nourished; in no apparent distress. Non-toxic appearing.   HEAD: Normocephalic, atraumatic.  EYES:  conjunctiva and sclera WNL.  ENT: normal nose; no rhinorrhea; Mild trismus. No drooling. Pt speaking in full sentences. No sublingual edema.   NECK/LYMPH: Supple; +Lt submandibular swelling noted with TTP over Lt mandible region.   RESP: Normal chest excursion with respiration; breath sounds clear and equal bilaterally; no wheezes, rhonchi, or rales.  EXT/MS: moves all extremities;  SKIN: Normal for age and race  NEURO: Awake, alert, oriented x 3, no gross deficits

## 2022-03-31 NOTE — ED CDU PROVIDER DISPOSITION NOTE - NSFOLLOWUPINSTRUCTIONS_ED_ALL_ED_FT
Your CT scan showed an enlarged gland and possible infection with salivary gland stone.  You should continue an antibiotic: Augmentin 875mg, 2x per day for 10 days.  You should use ibuprofen (Motrin or Advil) 400-600mg up to 3x per day, take with food.  Sal water gargle, lemon, lozenges may help pass small salivary stones.  Follow up with ENT in 1 wk: [phone] for appointment with Dr. Mancuso of ENT.

## 2022-03-31 NOTE — ED CDU PROVIDER DISPOSITION NOTE - NS ED ATTENDING STATEMENT MOD
This was a shared visit with the CATHY. I reviewed and verified the documentation and independently performed the documented: Attending with

## 2022-03-31 NOTE — PROGRESS NOTE ADULT - ASSESSMENT
21 y/o Male p/w 4d L neck pain, CT at [place] shows enlarged L submandibular gland with surrounding cellulitis. Sx most consistent with left sialadenitis, tried massage at bedside, patient unable to tolerates, does not see pus from the toño's duct
21 y/o Male p/w 4d L neck pain, CT at [place] shows enlarged L submandibular gland with surrounding cellulitis. Sx most consistent with left sialadenitis, monitored on IV unasyn, now much improved induration and TTP.  Problem: Sialadenitis.   Plan:  - Pt much improved, can transition to PO augmentin 875-125mg PO BID for a total of 10 days  - OK to dc from ENT standpoint  - keep saliva flowing to keep duct open and reduce risk of narrowing  - Massage gland every hour while awake  - ice for first 24 -48 hours, then can use warm compress q4 hours for 20 minutes while awake  - Aggressive PO hydration  - Tylenol or Motrin as needed  - Expect swelling and discomfort  - Sialagogues (lemon or sour candies) q4 hours while awake  - Follow up 1 week in the office, please call [phone] for an appointment  - [place] ENT at [phone] with any questions  - Case discussed with Dr. Mancuso.

## 2022-03-31 NOTE — ED CDU PROVIDER SUBSEQUENT DAY NOTE - MEDICAL DECISION MAKING DETAILS
Pt is a 22 y.o male with no known PMHx who presented to ED as transfer from [facility] ER for Lt jaw pain and swelling x 5 days. CT demonstrated enlarged submandibular gland, may represent possible phlegmon or early abscess. Pt transferred to [facility].  ENT evaluated patient. No airway involvement, likely sialoadenitis. Pt transferred to CDU for IV abx, pain management, IV decadron and re-eval in am.

## 2022-03-31 NOTE — ED CDU PROVIDER DISPOSITION NOTE - CLINICAL COURSE
22M with submandibular swelling s/p CT scan showing sialoadenitis. To CDU c/o painful swallowing and to observe improvement in swelling. Overnight no events, improved pain and swallowing, pt. amenable to dc w oral meds and outpt. FU. 22M with submandibular swelling s/p CT scan showing sialoadenitis. To CDU c/o painful swallowing and to observe improvement in swelling. Overnight no events, improved pain and swallowing, pt. amenable to dc w oral meds and outpt. FU.    DC Instructions given using language lines  [id number]

## 2022-03-31 NOTE — ED CDU PROVIDER SUBSEQUENT DAY NOTE - HISTORY
Pt is a 22 y.o male with no known PMHx who presented to ED as transfer from [facility] ER for Lt jaw pain and swelling x 5 days. CT demonstrated enlarged submandibular gland, may represent possible phlegmon or early abscess. Pt transferred to [facility].  ENT evaluated patient. No airway involvement, likely sialoadenitis. Pt transferred to CDU for IV abx, pain management, IV decadron and re-eval in am.     In interim- Pt feeling better, states pain and swelling slightly improved. Pt tolerating Po Liquids. Still with some mild trismus 2/2 pain. ENT following.

## 2022-03-31 NOTE — PROGRESS NOTE ADULT - NUTRITIONAL ASSESSMENT
23 y/o Male p/w 4d L neck pain, CT at [place] shows enlarged L submandibular gland with surrounding cellulitis. Sx most consistent with left sialadenitis, tried massage at bedside, patient unable to tolerates, does not see pus from the toño's duct  Problem: Sialadenitis.   Plan:  - Pt much improved, can transition to PO augmentin 875-125mg PO BID for a total of 10 days  - OK to dc from ENT standpoint  - keep saliva flowing to keep duct open and reduce risk of narrowing  - Massage gland every hour while awake  - ice for first 24 -48 hours, then can use warm compress q4 hours for 20 minutes while awake  - Aggressive PO hydration  - Tylenol or Motrin as needed  - Expect swelling and discomfort  - Sialagogues (lemon or sour candies) q4 hours while awake  - Follow up 1 week in the office, please call [phone] for an appointment  - [place] at [phone] with any questions  - Case discussed with Dr. Mancuso.

## 2022-03-31 NOTE — ED CDU PROVIDER DISPOSITION NOTE - PATIENT PORTAL LINK FT
You can access the FollowMyHealth Patient Portal offered by [facility] by registering at the following website: [email or web address]. By joining [facility] FollowMyHealth portal, you will also be able to view your health information using other applications (apps) compatible with our system.

## 2022-04-04 LAB
CULTURE RESULTS: SIGNIFICANT CHANGE UP
CULTURE RESULTS: SIGNIFICANT CHANGE UP
SPECIMEN SOURCE: SIGNIFICANT CHANGE UP
SPECIMEN SOURCE: SIGNIFICANT CHANGE UP

## 2022-07-12 NOTE — ED CDU PROVIDER INITIAL DAY NOTE - PRO INTERPRETER NEED 2
Thai

## 2023-10-26 NOTE — ED CDU PROVIDER SUBSEQUENT DAY NOTE - DATE/TIME 1
31-Mar-2022 09:53 Rhombic Flap Text: A rhombic transposition flap was designed. Local anesthesia was obtained. The tissue surrounding the operative site was undermined extensively with blunt scissor dissection. The flap was incised to the underlying adipose tissue. The flap was then undermined, elevated, and defatted. Hemostasis was obtained using electrocoagulation. The secondary defect was first closed by complex layered closure, moving the flap into the primary defect. The superficial fascia and subcutaneous layers were closed with buried vertical mattress sutures. The epidermis was then approximated with sutures. Careful attention was given to eversion and even approximation of the wound edges. A pressure dressing was applied.

## 2024-02-16 PROBLEM — Z78.9 OTHER SPECIFIED HEALTH STATUS: Chronic | Status: INACTIVE | Noted: 2022-03-29 | Resolved: 2022-03-29
